# Patient Record
Sex: MALE | Race: WHITE | NOT HISPANIC OR LATINO | Employment: OTHER | URBAN - METROPOLITAN AREA
[De-identification: names, ages, dates, MRNs, and addresses within clinical notes are randomized per-mention and may not be internally consistent; named-entity substitution may affect disease eponyms.]

---

## 2019-04-01 ENCOUNTER — TRANSCRIBE ORDERS (OUTPATIENT)
Dept: ADMINISTRATIVE | Facility: HOSPITAL | Age: 64
End: 2019-04-01

## 2019-04-01 DIAGNOSIS — I71.20 THORACIC AORTIC ANEURYSM WITHOUT RUPTURE (HCC): Primary | ICD-10-CM

## 2019-04-05 ENCOUNTER — HOSPITAL ENCOUNTER (OUTPATIENT)
Dept: CT IMAGING | Facility: HOSPITAL | Age: 64
Discharge: HOME | End: 2019-04-05
Admitting: INTERNAL MEDICINE

## 2019-04-05 DIAGNOSIS — I71.20 THORACIC AORTIC ANEURYSM WITHOUT RUPTURE (HCC): ICD-10-CM

## 2019-04-05 PROCEDURE — 82565 ASSAY OF CREATININE: CPT

## 2019-04-05 PROCEDURE — 25010000002 IOPAMIDOL 61 % SOLUTION: Performed by: INTERNAL MEDICINE

## 2019-04-05 PROCEDURE — 71260 CT THORAX DX C+: CPT

## 2019-04-05 RX ADMIN — IOPAMIDOL 75 ML: 612 INJECTION, SOLUTION INTRAVENOUS at 11:16

## 2019-04-08 LAB — CREAT BLDA-MCNC: 1.1 MG/DL (ref 0.6–1.3)

## 2019-04-29 ENCOUNTER — OFFICE VISIT (OUTPATIENT)
Dept: CARDIAC SURGERY | Facility: CLINIC | Age: 64
End: 2019-04-29

## 2019-04-29 VITALS
HEART RATE: 70 BPM | OXYGEN SATURATION: 99 % | DIASTOLIC BLOOD PRESSURE: 70 MMHG | SYSTOLIC BLOOD PRESSURE: 130 MMHG | WEIGHT: 179 LBS | BODY MASS INDEX: 26.51 KG/M2 | TEMPERATURE: 97.8 F | HEIGHT: 69 IN

## 2019-04-29 DIAGNOSIS — I71.20 THORACIC AORTIC ANEURYSM WITHOUT RUPTURE (HCC): Primary | ICD-10-CM

## 2019-04-29 DIAGNOSIS — R91.1 SOLITARY PULMONARY NODULE: ICD-10-CM

## 2019-04-29 PROCEDURE — 99205 OFFICE O/P NEW HI 60 MIN: CPT | Performed by: THORACIC SURGERY (CARDIOTHORACIC VASCULAR SURGERY)

## 2019-04-29 RX ORDER — ATORVASTATIN CALCIUM 20 MG/1
TABLET, FILM COATED ORAL DAILY
COMMUNITY
Start: 2018-12-01

## 2019-04-29 RX ORDER — AMLODIPINE BESYLATE 5 MG/1
5 TABLET ORAL DAILY
Refills: 3 | COMMUNITY
Start: 2019-02-15

## 2019-04-29 RX ORDER — FINASTERIDE 5 MG/1
5 TABLET, FILM COATED ORAL DAILY
Refills: 3 | COMMUNITY
Start: 2019-02-15

## 2019-04-29 NOTE — PROGRESS NOTES
04/29/2019  Patient Information  Zander SANDS    [place] [address]   1955  'PCP/Referring Physician'  Tanya Wen MD  [phone]  Magno Pennington MD  [phone]  Chief Complaint   Patient presents with   • Consult     NP per Dr. Pennington for Aortic Root Aneurysm-Followed for Several Years       History of Present Illness:   This patient was referred to me to evaluate a possible ascending aortic aneurysm.  However, this office visit became much more complex.  Apparently, he also has a pulmonary nodule on the CT scan for which he is concerned.  The patient seems fairly knowledgeable with his past medical history, but it appears that he has had a recent echocardiogram by Dr. Pennington which demonstrates some mild to moderate aortic valvular insufficiency.  I discussed that with Dr. Pennington by phone today and he felt the aortic insufficiency was more mild and not of a critical nature.  He thought that he may have seen an ascending aneurysm by echocardiogram and a CT scan was ordered of the chest.  The aortic root is slightly enlarged but there is no evidence of ascending aortic aneurysm and the maximum dimension is only 3.2 cm.  The patient has no unusual back or chest pain.  He says he is an avid bicyclist and in the last year has bicycled over 4000 miles.  He has no shortness of breath or any symptomatology.  He says he bicycles intensely.  He also tells me that his aortic valve had been followed since the 1990s by Dr. Sanchez.  He said that his aorta has always been slightly enlarged over a 25-year follow-up.  He says he has had a number of echocardiograms and CT scans at another hospital.  He was told that no surgical intervention need be performed.  He is here today as he was referred by Dr. Pennington to see me for the slightly enlarged aortic root.  In addition to this 9 mm left lower lobe  pulmonary nodule was detected and  the patient is concerned about that.  He is a lifelong nonsmoker.  He has had no weight loss.  He has no cough and he has no hemoptysis.  It is possible that this is arteriovenous malformation in the lung and not a nodule.      Patient Active Problem List   Diagnosis   • Thoracic aortic aneurysm without rupture (CMS/HCC)   • Solitary pulmonary nodule     Past Medical History:   Diagnosis Date   • Aortic root aneurysm (CMS/HCC)    • Arthritis    • Heart murmur    • Hyperlipidemia    • Hypertension    • Lung nodule    • Seizure (CMS/HCC)     1990     Past Surgical History:   Procedure Laterality Date   • KNEE SURGERY Left    • SHOULDER SURGERY Bilateral        Current Outpatient Medications:   •  amLODIPine (NORVASC) 5 MG tablet, Take 5 mg by mouth Daily., Disp: , Rfl: 3  •  atorvastatin (LIPITOR) 20 MG tablet, Daily., Disp: , Rfl:   •  finasteride (PROSCAR) 5 MG tablet, Take 5 mg by mouth Daily., Disp: , Rfl: 3  Allergies   Allergen Reactions   • Other Shortness Of Breath and Rash     Egg Whites     Social History     Socioeconomic History   • Marital status:      Spouse name: Not on file   • Number of children: 2   • Years of education: Not on file   • Highest education level: Not on file   Occupational History   • 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     Family History   Problem Relation Age of Onset   • No Known Problems Mother    • Prostate cancer Father      Review of Systems   Constitution: Negative for chills, fever, malaise/fatigue, night sweats and weight loss.   HENT: Negative for hearing loss, odynophagia and sore throat.    Cardiovascular: Negative for chest pain, dyspnea on exertion, leg swelling, orthopnea and palpitations.  "  Respiratory: Negative for cough and hemoptysis.    Endocrine: Negative for cold intolerance, heat intolerance, polydipsia, polyphagia and polyuria.   Hematologic/Lymphatic: Does not bruise/bleed easily.   Skin: Negative for itching and rash.   Musculoskeletal: Positive for arthritis and joint pain (left knee). Negative for joint swelling and myalgias.   Gastrointestinal: Negative for abdominal pain, constipation, diarrhea, hematemesis, hematochezia, melena, nausea and vomiting.   Genitourinary: Negative for dysuria, frequency and hematuria.   Neurological: Negative for focal weakness, headaches, numbness and seizures.   Psychiatric/Behavioral: Negative for suicidal ideas.   All other systems reviewed and are negative.    Vitals:    04/29/19 0740   BP: 130/70   BP Location: Left arm   Patient Position: Sitting   Pulse: 70   Temp: 97.8 °F (36.6 °C)   SpO2: 99%   Weight: 81.2 kg (179 lb)   Height: 175.3 cm (69\")      Physical Exam   CONSTITUTIONAL:   Alert and conversant, Well dressed, Well nourished, No acute distress  EYES:     Sclera clean, Anicteric, Pupils equal  ENT:     No nasal deviation, Trachea midlinery  NECK:    No masses or JVD.  LUNGS:   Decreased in the bases; no wheezing, rales or rhonchi.  CARDIAC:        Regular rate and rhythm without murmur or rub.  No carotid bruits.  GI:      The abdomen is soft, nontender, nondistended with normal bowel sounds.  No hepatosplenomegaly and no masses.  EXTREMITIES:    No cyanosis, clubbing or edema.  There is no evidence of extremity trauma or deformity.   SKIN:    No ulcers.  NEURO:   No focal motor or sensory deficits.  PSYCH:   Alert and oriented x3; mood and affect good.    Labs/Imaging:   I obtained and reviewed the echocardiogram from Dr. Pennington and discussed this with him and he feels the regurgitation is minimal.  I reviewed the CT scan report demonstrating a 3.2 cm ascending aorta.    Assessment/Plan:   The patient has a somewhat complex group of " problems.  He indicates to me that he has an enlarged aorta and a valve that has had subtle abnormalities for over 25 years that has been followed by Dr. Sanchez.  He also indicates to me that he has had a number of CT scans of his chest at [facility].  Today we spent a significant amount of time trying to locate these CT scans and was unable to find a record of any CT scan performed at [facility] or records from Dr. Mancini's office as that physician is now .  At this time the aortic valvular regurgitation is more mild according to Dr. Pennington and our phone conversation.  There is some enlargement of the aortic root and the maximum dimension of the aneurysm is 3.2 cm, which is below the criteria for repair.  However, again I have no outside dated to compare this to this time.  The patient is also concerned regarding the left-sided nodular abnormality, which potentially is an arteriovenous malformation.  At this time, I will obtain a repeat CT angiogram of the thoracic aorta to evaluate both the size of the aorta and the pulmonary nodule in the left lobe in 6 months.  We can then perform a comparison.  I also recommended to the patient and Dr. Pennington that we obtain a repeat echocardiogram in 6 months to evaluate the aortic valve.  Patient and his wife are agreeable to this and that will be our plan.    Patient Active Problem List   Diagnosis   • Thoracic aortic aneurysm without rupture (CMS/HCC)   • Solitary pulmonary nodule     CC: MD Tanya Najera MD Debbie Moore, , editing for Fly Lang M.D.    I, Fly Lang MD, have read and agree with the editing done by Norma Toscano, .

## 2019-11-11 ENCOUNTER — TELEPHONE (OUTPATIENT)
Dept: CARDIAC SURGERY | Facility: CLINIC | Age: 64
End: 2019-11-11

## 2019-11-11 NOTE — TELEPHONE ENCOUNTER
PT CALLING FOR A 6 MONTH F/U. PT HAS SOME QUESTIONS AND PT WOULD LIKE TO SPEAK WITH YOU BEFORE SCHEDULING ANY TEST OR APPTS

## 2019-11-18 DIAGNOSIS — I71.20 THORACIC AORTIC ANEURYSM WITHOUT RUPTURE (HCC): Primary | ICD-10-CM

## 2019-11-22 ENCOUNTER — HOSPITAL ENCOUNTER (OUTPATIENT)
Dept: CT IMAGING | Facility: HOSPITAL | Age: 64
Discharge: HOME | End: 2019-11-22
Admitting: PHYSICIAN ASSISTANT

## 2019-11-22 DIAGNOSIS — I71.20 THORACIC AORTIC ANEURYSM WITHOUT RUPTURE (HCC): ICD-10-CM

## 2019-11-22 LAB — CREAT BLDA-MCNC: 0.9 MG/DL (ref 0.6–1.3)

## 2019-11-22 PROCEDURE — 82565 ASSAY OF CREATININE: CPT

## 2019-11-22 PROCEDURE — 71275 CT ANGIOGRAPHY CHEST: CPT

## 2019-11-22 PROCEDURE — 0 IOPAMIDOL PER 1 ML: Performed by: PHYSICIAN ASSISTANT

## 2019-11-22 RX ADMIN — IOPAMIDOL 95 ML: 755 INJECTION, SOLUTION INTRAVENOUS at 14:50

## 2019-12-09 ENCOUNTER — OFFICE VISIT (OUTPATIENT)
Dept: CARDIAC SURGERY | Facility: CLINIC | Age: 64
End: 2019-12-09

## 2019-12-09 VITALS
TEMPERATURE: 98.1 F | DIASTOLIC BLOOD PRESSURE: 80 MMHG | HEIGHT: 69 IN | HEART RATE: 82 BPM | OXYGEN SATURATION: 99 % | SYSTOLIC BLOOD PRESSURE: 150 MMHG | BODY MASS INDEX: 26.81 KG/M2 | WEIGHT: 181 LBS

## 2019-12-09 DIAGNOSIS — I71.20 THORACIC AORTIC ANEURYSM WITHOUT RUPTURE (HCC): Primary | ICD-10-CM

## 2019-12-09 PROCEDURE — 99214 OFFICE O/P EST MOD 30 MIN: CPT | Performed by: THORACIC SURGERY (CARDIOTHORACIC VASCULAR SURGERY)

## 2019-12-09 NOTE — PROGRESS NOTES
12/09/2019  Patient Information  Zander SANDS O   [place] [address]   1955  'PCP/Referring Physician'  Tanya Wen MD  [phone]  No ref. provider found    Chief Complaint   Patient presents with   • Follow-up     6 MO FU with CT Chest for Ascending Aneurysm       History of Present Illness:   This is a gentleman that I am seeing for a small ascending aortic aneurysm or enlarged aortic root.  My previous office note from April 29 of this year outlines this very well.  He says he has been followed by other physicians for a long time and he says that at least 25 years ago he thought he remembered that his aorta was 3.1 cm in maximum dimension.  In any event, he had a small pulmonary nodule noted on the scan in April, which is no longer present or noted on the current scan.  Also the maximum dimension on our scan at this time is 3.2 cm, which means there has been no growth in the 6 months since I have seen him.  Furthermore, I discussed his echo findings with Dr. Pennington, who feels that his aortic valvular regurgitation is mild at most.  Dr. Pennington has him on an annual follow-up with an echocardiogram.  He does not feel that an echocardiogram is required any more frequently than on a 12-month basis.  At this time, the patient has no unusual back, flank or abdominal pain and he is continuing his high level of activity without symptomatology.      Patient Active Problem List   Diagnosis   • Thoracic aortic aneurysm without rupture (CMS/HCC)   • Solitary pulmonary nodule     Past Medical History:   Diagnosis Date   • Aortic root aneurysm (CMS/HCC)    • Arthritis    • Heart murmur    • Hyperlipidemia    • Hypertension    • Lung nodule    • Seizure (CMS/HCC)     1990     Past Surgical History:   Procedure Laterality Date   • KNEE SURGERY Left    • SHOULDER SURGERY Bilateral        Current Outpatient  Medications:   •  amLODIPine (NORVASC) 5 MG tablet, Take 5 mg by mouth Daily., Disp: , Rfl: 3  •  atorvastatin (LIPITOR) 20 MG tablet, Daily., Disp: , Rfl:   •  finasteride (PROSCAR) 5 MG tablet, Take 5 mg by mouth Daily., Disp: , Rfl: 3  Allergies   Allergen Reactions   • Other Shortness Of Breath and Rash     Egg Whites     Social History     Socioeconomic History   • Marital status:      Spouse name: Not on file   • Number of children: 2   • Years of education: Not on file   • Highest education level: Not on file   Occupational History   • 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     Family History   Problem Relation Age of Onset   • No Known Problems Mother    • Prostate cancer Father      Review of Systems   Constitution: Negative for chills, fever, malaise/fatigue, night sweats and weight loss.   HENT: Negative for hearing loss, odynophagia and sore throat.    Cardiovascular: Negative for chest pain, dyspnea on exertion, leg swelling, orthopnea and palpitations.   Respiratory: Positive for cough. Negative for hemoptysis.    Endocrine: Negative for cold intolerance, heat intolerance, polydipsia, polyphagia and polyuria.   Hematologic/Lymphatic: Does not bruise/bleed easily.   Skin: Negative for itching and rash.   Musculoskeletal: Positive for arthritis and joint pain (left knee). Negative for joint swelling and myalgias.   Gastrointestinal: Negative for abdominal pain, constipation, diarrhea, hematemesis, hematochezia, melena, nausea and vomiting.   Genitourinary: Negative for dysuria, frequency and hematuria.   Neurological: Negative for focal weakness, headaches, numbness and seizures.   Psychiatric/Behavioral: Negative for suicidal ideas.   All other systems reviewed and are  "negative.    Vitals:    12/09/19 0753   BP: 150/80   BP Location: Left arm   Patient Position: Sitting   Pulse: 82   Temp: 98.1 °F (36.7 °C)   SpO2: 99%   Weight: 82.1 kg (181 lb)   Height: 175.3 cm (69\")      Physical Exam   CONSTITUTIONAL: Alert and conversant, Well dressed, Well nourished, No acute distress  EYES: Sclera clean, Anicteric, Pupils equal  ENT: No nasal deviation, Trachea midline  NECK: No neck masses, Supple  LUNGS: No wheezing, Cough, non-congested  HEART: No rubs,   GASTROINTESTINAL: Soft, non-distended, No masses, Non tender  to palpation, normal bowel sounds  NEURO: No motor deficits, No sensory deficits, Cranial Nerves 2 through 12 grossly intact  PSYCHIATRIC: Oriented to person, place and time, No memory deficits, Mood appropriate  VASCULAR: No carotid bruits. Femoral pulses palpable and symmetric  MUSKULOSKELETAL: No extremity trauma or extremity asymmetry    Labs/Imaging:   I reviewed the CT scan demonstrating the maximum size aorta at 3.2 cm.    Assessment/Plan:   This patient has apparently had mild aortic valvular insufficiency for 25 years with no significant growth in his ascending aorta.  He has always been concerned about this.  He has actually had both of his children evaluated for aortic aneurysms.  At this time, there is no evidence of the small pulmonary nodule which had been noted on the previous scan.  There also is no significant change in the size of the small root.  Dr. Pennington is scheduling a repeat echocardiogram in 1 year and I would like to obtain a CT scan in 1 year to assess the size of his aorta.  If this aorta is still stable in 1 year, I may increase the interval to 18 months or possibly even 24 months from my standpoint as I believe an echocardiogram will be satisfactory.  The patient is agreeable to this plan.  He is welcome to seek other opinions if he chooses; however, he is agreeable to follow this plan.  Patient Active Problem List   Diagnosis   • Thoracic " aortic aneurysm without rupture (CMS/HCC)   • Solitary pulmonary nodule     CC: MD Tanya Najera MD Debbie Moore, , editing for Fly Lang M.D.    I, Fly Lang MD, have read and agree with the editing done by Norma Toscano, .

## 2021-03-15 ENCOUNTER — TELEPHONE (OUTPATIENT)
Dept: CARDIAC SURGERY | Facility: CLINIC | Age: 66
End: 2021-03-15

## 2021-03-15 NOTE — TELEPHONE ENCOUNTER
RECALL LETTER MAILED ON 12/21/20  Called to discuss and schedule f/u w/testing but pt didn't answer, lvm with details.